# Patient Record
Sex: FEMALE | Race: OTHER | HISPANIC OR LATINO | URBAN - METROPOLITAN AREA
[De-identification: names, ages, dates, MRNs, and addresses within clinical notes are randomized per-mention and may not be internally consistent; named-entity substitution may affect disease eponyms.]

---

## 2023-07-06 PROBLEM — Z00.00 ENCOUNTER FOR PREVENTIVE HEALTH EXAMINATION: Status: ACTIVE | Noted: 2023-07-06

## 2023-07-07 ENCOUNTER — OUTPATIENT (OUTPATIENT)
Dept: OUTPATIENT SERVICES | Facility: HOSPITAL | Age: 63
LOS: 1 days | End: 2023-07-07
Payer: SELF-PAY

## 2023-07-07 ENCOUNTER — APPOINTMENT (OUTPATIENT)
Dept: FAMILY MEDICINE | Facility: HOSPITAL | Age: 63
End: 2023-07-07

## 2023-07-07 VITALS
RESPIRATION RATE: 14 BRPM | SYSTOLIC BLOOD PRESSURE: 131 MMHG | TEMPERATURE: 97.9 F | WEIGHT: 108 LBS | DIASTOLIC BLOOD PRESSURE: 79 MMHG | OXYGEN SATURATION: 99 % | HEART RATE: 81 BPM

## 2023-07-07 DIAGNOSIS — Z00.00 ENCOUNTER FOR GENERAL ADULT MEDICAL EXAMINATION WITHOUT ABNORMAL FINDINGS: ICD-10-CM

## 2023-07-07 DIAGNOSIS — R45.89 OTHER SYMPTOMS AND SIGNS INVOLVING EMOTIONAL STATE: ICD-10-CM

## 2023-07-07 DIAGNOSIS — Z78.9 OTHER SPECIFIED HEALTH STATUS: ICD-10-CM

## 2023-07-07 DIAGNOSIS — K50.00 CROHN'S DISEASE OF SMALL INTESTINE WITHOUT COMPLICATIONS: ICD-10-CM

## 2023-07-07 DIAGNOSIS — K30 FUNCTIONAL DYSPEPSIA: ICD-10-CM

## 2023-07-07 PROCEDURE — G0463: CPT

## 2023-07-09 NOTE — INTERPRETER SERVICES
[Other: ______] : provided by GRACIELA [Interpreters_IDNumber] : [id number] [Interpreters_FullName] : Neva

## 2023-07-09 NOTE — REVIEW OF SYSTEMS
[Abdominal Pain] : abdominal pain [Nausea] : nausea [Constipation] : constipation [Negative] : Respiratory [de-identified] : feeling sad and anxiety related to discomfort she is feeling

## 2023-07-09 NOTE — PHYSICAL EXAM
[No Acute Distress] : no acute distress [Well Nourished] : well nourished [Normal Sclera/Conjunctiva] : normal sclera/conjunctiva [EOMI] : extraocular movements intact [No Respiratory Distress] : no respiratory distress  [No Accessory Muscle Use] : no accessory muscle use [Clear to Auscultation] : lungs were clear to auscultation bilaterally [Normal Rate] : normal rate  [Regular Rhythm] : with a regular rhythm [Normal S1, S2] : normal S1 and S2 [No Murmur] : no murmur heard [No Edema] : there was no peripheral edema [Soft] : abdomen soft [Non-distended] : non-distended [Normal Bowel Sounds] : normal bowel sounds [Normal Affect] : the affect was normal [Normal Insight/Judgement] : insight and judgment were intact [de-identified] : mild lower abdominal discomfort

## 2023-07-09 NOTE — HISTORY OF PRESENT ILLNESS
[FreeTextEntry8] : 61 y/o F with PMHx who is here for stomach upset. \par \par Says pain from stomach, can radiate to throat, occurs every 2 to 3 days. Denies associated with activity or eating. Also endorses bloating, constipation. Last BM 5 days ago. \par \par Also intermittent headache, helps with acetaminophen or ibuprofen. \par \par Denies any PMHx, FH or surgical history.\par \par endorses feeling sad and anxious, related to the discomfort \par MACEY 7, PHQ-9= 4\par \par Denies fever, chills, headache, chest pain, SOB, diarrhea, urinary issues or other concerns.\par \par

## 2023-07-20 ENCOUNTER — APPOINTMENT (OUTPATIENT)
Dept: FAMILY MEDICINE | Facility: HOSPITAL | Age: 63
End: 2023-07-20

## 2023-07-20 ENCOUNTER — OUTPATIENT (OUTPATIENT)
Dept: OUTPATIENT SERVICES | Facility: HOSPITAL | Age: 63
LOS: 1 days | End: 2023-07-20
Payer: SELF-PAY

## 2023-07-20 VITALS
HEART RATE: 75 BPM | DIASTOLIC BLOOD PRESSURE: 64 MMHG | OXYGEN SATURATION: 98 % | SYSTOLIC BLOOD PRESSURE: 116 MMHG | RESPIRATION RATE: 14 BRPM | TEMPERATURE: 97.8 F | HEIGHT: 60 IN

## 2023-07-20 DIAGNOSIS — Z00.00 ENCOUNTER FOR GENERAL ADULT MEDICAL EXAMINATION WITHOUT ABNORMAL FINDINGS: ICD-10-CM

## 2023-07-20 DIAGNOSIS — R51.9 HEADACHE, UNSPECIFIED: ICD-10-CM

## 2023-07-20 DIAGNOSIS — K59.00 CONSTIPATION, UNSPECIFIED: ICD-10-CM

## 2023-07-20 DIAGNOSIS — F41.8 OTHER SPECIFIED ANXIETY DISORDERS: ICD-10-CM

## 2023-07-20 DIAGNOSIS — Z00.00 ENCOUNTER FOR GENERAL ADULT MEDICAL EXAMINATION W/OUT ABNORMAL FINDINGS: ICD-10-CM

## 2023-07-20 DIAGNOSIS — R45.89 OTHER SYMPTOMS AND SIGNS INVOLVING EMOTIONAL STATE: ICD-10-CM

## 2023-07-20 DIAGNOSIS — K30 FUNCTIONAL DYSPEPSIA: ICD-10-CM

## 2023-07-20 RX ORDER — PSYLLIUM HUSK 0.4 G
0.52 CAPSULE ORAL TWICE DAILY
Qty: 60 | Refills: 0 | Status: ACTIVE | COMMUNITY
Start: 2023-07-20

## 2023-07-20 NOTE — REVIEW OF SYSTEMS
[Abdominal Pain] : abdominal pain [Constipation] : constipation [Heartburn] : heartburn [Headache] : headache [Anxiety] : anxiety [Depression] : depression [Negative] : Genitourinary [Hoarseness] : no hoarseness [Fainting] : no fainting [Memory Loss] : no memory loss [Suicidal] : not suicidal [FreeTextEntry4] : Possible dysphagia but

## 2023-07-20 NOTE — INTERPRETER SERVICES
[[employer] ] : provided by [employer]   [Interpreters_IDNumber] : [id number] [Interpreters_FullName] : Samina [TWNoteComboBox1] : [place]

## 2023-07-20 NOTE — HEALTH RISK ASSESSMENT
[1 or 2 (0 pts)] : 1 or 2 (0 points) [Never (0 pts)] : Never (0 points) [No] : In the past 12 months have you used drugs other than those required for medical reasons? No [PHQ-9 Positive] : PHQ-9 Positive [Health Literacy] : health literacy [With Family] : lives with family [Single] : single [Feels Safe at Home] : Feels safe at home [Fully functional (bathing, dressing, toileting, transferring, walking, feeding)] : Fully functional (bathing, dressing, toileting, transferring, walking, feeding) [Fully functional (using the telephone, shopping, preparing meals, housekeeping, doing laundry, using] : Fully functional and needs no help or supervision to perform IADLs (using the telephone, shopping, preparing meals, housekeeping, doing laundry, using transportation, managing medications and managing finances) [With Patient/Caregiver] : , with patient/caregiver [Designated Healthcare Proxy] : Designated healthcare proxy [Name: ___] : Health Care Proxy's Name: [unfilled]  [Relationship: ___] : Relationship: [unfilled] [Aggressive treatment] : aggressive treatment [I will adhere to the patient's wishes.] : I will adhere to the patient's wishes. [Never] : Never [Audit-CScore] : 0 [Sexually Active] : not sexually active [FreeTextEntry2] : Retired [AdvancecareDate] : 07/23

## 2023-07-20 NOTE — HISTORY OF PRESENT ILLNESS
[FreeTextEntry1] : CPE [de-identified] : 61 yo F, PMH of constipation, depression with anxiety, presents for CPE. Today pt elaborates on cc of stomach upset, noting that although it can be constant, it is worsened after meals, and accompanied by a burning pain that rises up from the belly, through the chest, and up to the neck, to the point she may have trouble swallowing and notes a bitter taste in her mouth.\par Pt also notes several month hx of headache "on the top of the head,"  denies auras, but endorses phonophobia and photophobia and possibly also some neck tension as well. She denies fevers, chills, changes in vision, cp/palpitations, sob.\par Pt also here for f/u constipation and \par No other urgent sxs or complaints endorsed at this time.\par

## 2023-07-20 NOTE — PHYSICAL EXAM
[No Acute Distress] : no acute distress [Well-Appearing] : well-appearing [Normal Voice/Communication] : normal voice/communication [Normal Sclera/Conjunctiva] : normal sclera/conjunctiva [PERRL] : pupils equal round and reactive to light [EOMI] : extraocular movements intact [Normal Outer Ear/Nose] : the outer ears and nose were normal in appearance [Normal Oropharynx] : the oropharynx was normal [No JVD] : no jugular venous distention [No Lymphadenopathy] : no lymphadenopathy [Supple] : supple [No Respiratory Distress] : no respiratory distress  [No Accessory Muscle Use] : no accessory muscle use [Clear to Auscultation] : lungs were clear to auscultation bilaterally [Normal Rate] : normal rate  [Regular Rhythm] : with a regular rhythm [Normal S1, S2] : normal S1 and S2 [Soft] : abdomen soft [Non Tender] : non-tender [Non-distended] : non-distended [No CVA Tenderness] : no CVA  tenderness [No Spinal Tenderness] : no spinal tenderness [Coordination Grossly Intact] : coordination grossly intact [No Focal Deficits] : no focal deficits [Normal Gait] : normal gait [Normal Affect] : the affect was normal [Normal Insight/Judgement] : insight and judgment were intact

## 2023-08-03 ENCOUNTER — OUTPATIENT (OUTPATIENT)
Dept: OUTPATIENT SERVICES | Facility: HOSPITAL | Age: 63
LOS: 1 days | End: 2023-08-03
Payer: SELF-PAY

## 2023-08-03 ENCOUNTER — OUTPATIENT (OUTPATIENT)
Dept: OUTPATIENT SERVICES | Facility: HOSPITAL | Age: 63
LOS: 1 days | End: 2023-08-03
Payer: COMMERCIAL

## 2023-08-03 ENCOUNTER — APPOINTMENT (OUTPATIENT)
Dept: FAMILY MEDICINE | Facility: HOSPITAL | Age: 63
End: 2023-08-03

## 2023-08-03 VITALS
OXYGEN SATURATION: 100 % | BODY MASS INDEX: 21.48 KG/M2 | RESPIRATION RATE: 16 BRPM | TEMPERATURE: 98.6 F | WEIGHT: 110 LBS | DIASTOLIC BLOOD PRESSURE: 65 MMHG | HEART RATE: 74 BPM | SYSTOLIC BLOOD PRESSURE: 134 MMHG

## 2023-08-03 DIAGNOSIS — K30 FUNCTIONAL DYSPEPSIA: ICD-10-CM

## 2023-08-03 DIAGNOSIS — Z12.11 ENCOUNTER FOR SCREENING FOR MALIGNANT NEOPLASM OF COLON: ICD-10-CM

## 2023-08-03 DIAGNOSIS — Z00.00 ENCOUNTER FOR GENERAL ADULT MEDICAL EXAMINATION WITHOUT ABNORMAL FINDINGS: ICD-10-CM

## 2023-08-03 DIAGNOSIS — Z12.39 ENCOUNTER FOR OTHER SCREENING FOR MALIGNANT NEOPLASM OF BREAST: ICD-10-CM

## 2023-08-03 DIAGNOSIS — K59.00 CONSTIPATION, UNSPECIFIED: ICD-10-CM

## 2023-08-03 PROCEDURE — G0463: CPT

## 2023-08-03 NOTE — HISTORY OF PRESENT ILLNESS
[FreeTextEntry1] : CSP and f/u  [de-identified] : 61 y/o F with PMH of constipation, depression with anxiety who is here for f/u for stomach upset and CSP.  Was given H pylori test last visit, says will bring to the office when it is ready. PPI minimal help.  Never did mammogram, fecal occult blood test or colonoscopy before.  Denies new lump in the breast/ armpit, breast thickening/ swelling, irritation/ dimpling of breast skin, redness/ flaky skin in the breast/ pulling in of the nipple or pain in the nipple area, nipple discharge, changes in size or the shape of the breast or breast pain No FH of breast cancer.  Denies change in bowel habits/ narrowing of stool, tenesmus, rectal bleeding or blood in stool, abdominal discomfort, weakness/ fatigue, unexplained weight loss. No FH of colon cancer. Still endorses constipation, once every week. Never picked up suppository prescribed last visit. Says metamucil minimal help, and now drinking 3 bottles of water each day.  Declines pap test /HPV test at the time of visit. Last pap in [facility] 9 months ago, result unremarkable.  Denies fever, chills, headache, chest pain, SOB, abdominal pain, diarrhea, urinary issues or other concerns.   [FreeTextEntry8] : 63 y/o F with PMH of constipation, depression with anxiety who is here for f/u for stomach upset and CSP.   Was given H pylori test last visit, says will bring to the office when it is ready. PPI minimal help.   Never did mammogram, fecal occult blood test or colonoscopy before.   Denies new lump in the breast/ armpit, breast thickening/ swelling, irritation/ dimpling of breast skin, redness/ flaky skin in the breast/ pulling in of the nipple or pain in the nipple area, nipple discharge, changes in size or the shape of the breast or breast pain  No FH of breast cancer.   Denies change in bowel habits/ narrowing of stool, tenesmus, rectal bleeding or blood in stool, abdominal discomfort, weakness/ fatigue, unexplained weight loss. No FH of colon cancer. Still endorses constipation, once every week. Never picked up suppository prescribed last visit. Says metamucil minimal help, and now drinking 3 bottles of water each day.   Declines pap test /HPV test at the time of visit. Last pap in [facility] 9 months ago, result unremarkable.   Denies fever, chills, headache, chest pain, SOB, abdominal pain, diarrhea, urinary issues or other concerns.

## 2023-08-03 NOTE — INTERPRETER SERVICES
[Other: ______] : provided by GRACIELA [Interpreters_IDNumber] : [id number] [Interpreters_FullName] : Patrick

## 2023-08-03 NOTE — HISTORY OF PRESENT ILLNESS
[FreeTextEntry1] : CSP and f/u  [de-identified] : 63 y/o F with PMH of constipation, depression with anxiety who is here for f/u for stomach upset and CSP.  Was given H pylori test last visit, says will bring to the office when it is ready. PPI minimal help.  Never did mammogram, fecal occult blood test or colonoscopy before.  Denies new lump in the breast/ armpit, breast thickening/ swelling, irritation/ dimpling of breast skin, redness/ flaky skin in the breast/ pulling in of the nipple or pain in the nipple area, nipple discharge, changes in size or the shape of the breast or breast pain No FH of breast cancer.  Denies change in bowel habits/ narrowing of stool, tenesmus, rectal bleeding or blood in stool, abdominal discomfort, weakness/ fatigue, unexplained weight loss. No FH of colon cancer. Still endorses constipation, once every week. Never picked up suppository prescribed last visit. Says metamucil minimal help, and now drinking 3 bottles of water each day.  Declines pap test /HPV test at the time of visit. Last pap in [facility] 9 months ago, result unremarkable.  Denies fever, chills, headache, chest pain, SOB, abdominal pain, diarrhea, urinary issues or other concerns.   [FreeTextEntry8] : 61 y/o F with PMH of constipation, depression with anxiety who is here for f/u for stomach upset and CSP.   Was given H pylori test last visit, says will bring to the office when it is ready. PPI minimal help.   Never did mammogram, fecal occult blood test or colonoscopy before.   Denies new lump in the breast/ armpit, breast thickening/ swelling, irritation/ dimpling of breast skin, redness/ flaky skin in the breast/ pulling in of the nipple or pain in the nipple area, nipple discharge, changes in size or the shape of the breast or breast pain  No FH of breast cancer.   Denies change in bowel habits/ narrowing of stool, tenesmus, rectal bleeding or blood in stool, abdominal discomfort, weakness/ fatigue, unexplained weight loss. No FH of colon cancer. Still endorses constipation, once every week. Never picked up suppository prescribed last visit. Says metamucil minimal help, and now drinking 3 bottles of water each day.   Declines pap test /HPV test at the time of visit. Last pap in [facility] 9 months ago, result unremarkable.   Denies fever, chills, headache, chest pain, SOB, abdominal pain, diarrhea, urinary issues or other concerns.

## 2023-08-03 NOTE — PHYSICAL EXAM
[No Acute Distress] : no acute distress [Well Developed] : well developed [Normal Sclera/Conjunctiva] : normal sclera/conjunctiva [PERRL] : pupils equal round and reactive to light [EOMI] : extraocular movements intact [No Respiratory Distress] : no respiratory distress  [No Accessory Muscle Use] : no accessory muscle use [Clear to Auscultation] : lungs were clear to auscultation bilaterally [Normal Rate] : normal rate  [Regular Rhythm] : with a regular rhythm [Normal S1, S2] : normal S1 and S2 [No Murmur] : no murmur heard [No Edema] : there was no peripheral edema [Soft] : abdomen soft [Non-distended] : non-distended [Normal Bowel Sounds] : normal bowel sounds [No Rash] : no rash [Coordination Grossly Intact] : coordination grossly intact [No Focal Deficits] : no focal deficits [Normal Affect] : the affect was normal [Normal Insight/Judgement] : insight and judgment were intact [Normal Appearance] : normal in appearance [No Masses] : no palpable masses [No Nipple Discharge] : no nipple discharge [No Axillary Lymphadenopathy] : no axillary lymphadenopathy [de-identified] : chaperone, PCA: Ana  [de-identified] : epigastric tenderness

## 2023-08-03 NOTE — PHYSICAL EXAM
[No Acute Distress] : no acute distress [Well Developed] : well developed [Normal Sclera/Conjunctiva] : normal sclera/conjunctiva [PERRL] : pupils equal round and reactive to light [EOMI] : extraocular movements intact [No Respiratory Distress] : no respiratory distress  [No Accessory Muscle Use] : no accessory muscle use [Clear to Auscultation] : lungs were clear to auscultation bilaterally [Normal Rate] : normal rate  [Regular Rhythm] : with a regular rhythm [Normal S1, S2] : normal S1 and S2 [No Murmur] : no murmur heard [No Edema] : there was no peripheral edema [Soft] : abdomen soft [Non-distended] : non-distended [Normal Bowel Sounds] : normal bowel sounds [No Rash] : no rash [Coordination Grossly Intact] : coordination grossly intact [No Focal Deficits] : no focal deficits [Normal Affect] : the affect was normal [Normal Insight/Judgement] : insight and judgment were intact [Normal Appearance] : normal in appearance [No Masses] : no palpable masses [No Nipple Discharge] : no nipple discharge [No Axillary Lymphadenopathy] : no axillary lymphadenopathy [de-identified] : chaperone, PCA: Ana  [de-identified] : epigastric tenderness

## 2023-08-03 NOTE — INTERPRETER SERVICES
[Other: ______] : provided by GRACIELA [Interpreters_IDNumber] : [id number] [Interpreters_FullName] : Patrick

## 2023-08-04 DIAGNOSIS — Z12.11 ENCOUNTER FOR SCREENING FOR MALIGNANT NEOPLASM OF COLON: ICD-10-CM

## 2023-08-04 DIAGNOSIS — K30 FUNCTIONAL DYSPEPSIA: ICD-10-CM

## 2023-08-04 DIAGNOSIS — K59.00 CONSTIPATION, UNSPECIFIED: ICD-10-CM

## 2023-08-04 DIAGNOSIS — Z12.39 ENCOUNTER FOR OTHER SCREENING FOR MALIGNANT NEOPLASM OF BREAST: ICD-10-CM

## 2023-08-04 PROCEDURE — 87338 HPYLORI STOOL AG IA: CPT

## 2023-08-04 PROCEDURE — 85025 COMPLETE CBC W/AUTO DIFF WBC: CPT

## 2023-08-04 PROCEDURE — 84443 ASSAY THYROID STIM HORMONE: CPT

## 2023-08-04 PROCEDURE — 80053 COMPREHEN METABOLIC PANEL: CPT

## 2023-08-04 PROCEDURE — 80061 LIPID PANEL: CPT

## 2023-08-04 PROCEDURE — 83036 HEMOGLOBIN GLYCOSYLATED A1C: CPT

## 2023-08-04 PROCEDURE — G0463: CPT

## 2023-08-09 DIAGNOSIS — A04.8 OTHER SPECIFIED BACTERIAL INTESTINAL INFECTIONS: ICD-10-CM

## 2023-08-09 LAB
ALBUMIN SERPL ELPH-MCNC: 4.7 G/DL
ALP BLD-CCNC: 121 U/L
ALT SERPL-CCNC: 20 U/L
ANION GAP SERPL CALC-SCNC: 13 MMOL/L
AST SERPL-CCNC: 29 U/L
BILIRUB SERPL-MCNC: 0.3 MG/DL
BUN SERPL-MCNC: 12 MG/DL
CALCIUM SERPL-MCNC: 10 MG/DL
CHLORIDE SERPL-SCNC: 102 MMOL/L
CHOLEST SERPL-MCNC: 246 MG/DL
CO2 SERPL-SCNC: 24 MMOL/L
CREAT SERPL-MCNC: 0.62 MG/DL
EGFR: 101 ML/MIN/1.73M2
ESTIMATED AVERAGE GLUCOSE: 111 MG/DL
GLUCOSE SERPL-MCNC: 85 MG/DL
H PYLORI AG STL QL: POSITIVE
HBA1C MFR BLD HPLC: 5.5 %
HDLC SERPL-MCNC: 69 MG/DL
LDLC SERPL CALC-MCNC: 159 MG/DL
NONHDLC SERPL-MCNC: 177 MG/DL
POTASSIUM SERPL-SCNC: 4.5 MMOL/L
PROT SERPL-MCNC: 7.8 G/DL
SODIUM SERPL-SCNC: 139 MMOL/L
TRIGL SERPL-MCNC: 106 MG/DL
TSH SERPL-ACNC: 0.97 UIU/ML

## 2023-08-09 RX ORDER — AMOXICILLIN 500 MG/1
500 TABLET, FILM COATED ORAL
Qty: 56 | Refills: 0 | Status: ACTIVE | COMMUNITY
Start: 2023-08-09 | End: 1900-01-01

## 2023-08-09 RX ORDER — CLARITHROMYCIN 500 MG/1
500 TABLET, FILM COATED ORAL
Qty: 28 | Refills: 0 | Status: ACTIVE | COMMUNITY
Start: 2023-08-09 | End: 1900-01-01

## 2023-08-09 RX ORDER — PANTOPRAZOLE 40 MG/1
40 TABLET, DELAYED RELEASE ORAL DAILY
Qty: 30 | Refills: 3 | Status: ACTIVE | COMMUNITY
Start: 2023-07-20 | End: 1900-01-01

## 2023-08-24 ENCOUNTER — APPOINTMENT (OUTPATIENT)
Dept: MAMMOGRAPHY | Facility: HOSPITAL | Age: 63
End: 2023-08-24

## 2023-08-24 ENCOUNTER — OUTPATIENT (OUTPATIENT)
Dept: OUTPATIENT SERVICES | Facility: HOSPITAL | Age: 63
LOS: 1 days | End: 2023-08-24

## 2023-08-24 DIAGNOSIS — Z12.39 ENCOUNTER FOR OTHER SCREENING FOR MALIGNANT NEOPLASM OF BREAST: ICD-10-CM

## 2023-08-24 DIAGNOSIS — Z00.8 ENCOUNTER FOR OTHER GENERAL EXAMINATION: ICD-10-CM

## 2023-09-05 ENCOUNTER — NON-APPOINTMENT (OUTPATIENT)
Age: 63
End: 2023-09-05

## 2023-10-02 ENCOUNTER — NON-APPOINTMENT (OUTPATIENT)
Age: 63
End: 2023-10-02

## 2023-10-25 ENCOUNTER — NON-APPOINTMENT (OUTPATIENT)
Age: 63
End: 2023-10-25